# Patient Record
Sex: MALE | Race: OTHER
[De-identification: names, ages, dates, MRNs, and addresses within clinical notes are randomized per-mention and may not be internally consistent; named-entity substitution may affect disease eponyms.]

---

## 2019-12-27 ENCOUNTER — HOSPITAL ENCOUNTER (OUTPATIENT)
Dept: HOSPITAL 84 - D.ER | Age: 34
LOS: 1 days | Discharge: HOME | End: 2019-12-28
Attending: INTERNAL MEDICINE
Payer: MEDICAID

## 2019-12-27 VITALS — DIASTOLIC BLOOD PRESSURE: 69 MMHG | SYSTOLIC BLOOD PRESSURE: 110 MMHG

## 2019-12-27 VITALS
BODY MASS INDEX: 29.41 KG/M2 | WEIGHT: 205.43 LBS | HEIGHT: 70 IN | BODY MASS INDEX: 29.41 KG/M2 | HEIGHT: 70 IN | WEIGHT: 205.43 LBS

## 2019-12-27 VITALS — SYSTOLIC BLOOD PRESSURE: 138 MMHG | DIASTOLIC BLOOD PRESSURE: 87 MMHG

## 2019-12-27 DIAGNOSIS — S61.542A: Primary | ICD-10-CM

## 2019-12-27 DIAGNOSIS — F41.0: ICD-10-CM

## 2019-12-27 DIAGNOSIS — F45.22: ICD-10-CM

## 2019-12-27 DIAGNOSIS — F14.90: ICD-10-CM

## 2019-12-27 DIAGNOSIS — F17.203: ICD-10-CM

## 2019-12-27 DIAGNOSIS — G62.9: ICD-10-CM

## 2019-12-27 DIAGNOSIS — F43.10: ICD-10-CM

## 2019-12-27 DIAGNOSIS — F19.10: ICD-10-CM
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
## 2019-12-27 DIAGNOSIS — M54.9: ICD-10-CM

## 2019-12-27 DIAGNOSIS — L03.114: ICD-10-CM

## 2019-12-27 DIAGNOSIS — T79.A12A: ICD-10-CM

## 2019-12-27 LAB
ALBUMIN SERPL-MCNC: 3.7 G/DL (ref 3.4–5)
ALP SERPL-CCNC: 79 U/L (ref 46–116)
ALT SERPL-CCNC: 19 U/L (ref 10–68)
AMPHETAMINES UR QL SCN: NEGATIVE QUAL
ANION GAP SERPL CALC-SCNC: 19.2 MMOL/L (ref 8–16)
APPEARANCE UR: CLEAR
BARBITURATES UR QL SCN: NEGATIVE QUAL
BASOPHILS NFR BLD AUTO: 0.1 % (ref 0–2)
BENZODIAZ UR QL SCN: POSITIVE QUAL
BILIRUB SERPL-MCNC: 0.77 MG/DL (ref 0.2–1.3)
BILIRUB SERPL-MCNC: NEGATIVE MG/DL
BUN SERPL-MCNC: 23 MG/DL (ref 7–18)
BZE UR QL SCN: NEGATIVE QUAL
CALCIUM SERPL-MCNC: 9.5 MG/DL (ref 8.5–10.1)
CANNABINOIDS UR QL SCN: POSITIVE QUAL
CHLORIDE SERPL-SCNC: 100 MMOL/L (ref 98–107)
CO2 SERPL-SCNC: 20.8 MMOL/L (ref 21–32)
COLOR UR: YELLOW
CREAT SERPL-MCNC: 1.2 MG/DL (ref 0.6–1.3)
CRP SERPL-MCNC: 22 MG/DL (ref 0–0.9)
EOSINOPHIL NFR BLD: 0.2 % (ref 0–7)
ERYTHROCYTE [DISTWIDTH] IN BLOOD BY AUTOMATED COUNT: 13.7 % (ref 11.5–14.5)
GLOBULIN SER-MCNC: 4.3 G/L
GLUCOSE SERPL-MCNC: 95 MG/DL (ref 74–106)
GLUCOSE SERPL-MCNC: NEGATIVE MG/DL
HCT VFR BLD CALC: 40.3 % (ref 42–54)
HGB BLD-MCNC: 13.6 G/DL (ref 13.5–17.5)
IMM GRANULOCYTES NFR BLD: 0.3 % (ref 0–5)
KETONES UR STRIP-MCNC: (no result) MG/DL
LYMPHOCYTES NFR BLD AUTO: 6.8 % (ref 15–50)
MCH RBC QN AUTO: 30 PG (ref 26–34)
MCHC RBC AUTO-ENTMCNC: 33.7 G/DL (ref 31–37)
MCV RBC: 89 FL (ref 80–100)
MONOCYTES NFR BLD: 7.8 % (ref 2–11)
NEUTROPHILS NFR BLD AUTO: 84.8 % (ref 40–80)
NITRITE UR-MCNC: NEGATIVE MG/ML
OPIATES UR QL SCN: NEGATIVE QUAL
OSMOLALITY SERPL CALC.SUM OF ELEC: 273 MOSM/KG (ref 275–300)
PCP UR QL SCN: NEGATIVE QUAL
PH UR STRIP: 5 [PH] (ref 5–6)
PLATELET # BLD: 240 10X3/UL (ref 130–400)
PMV BLD AUTO: 10 FL (ref 7.4–10.4)
POTASSIUM SERPL-SCNC: 5 MMOL/L (ref 3.5–5.1)
PROT SERPL-MCNC: 8 G/DL (ref 6.4–8.2)
PROT UR-MCNC: NEGATIVE MG/DL
RBC # BLD AUTO: 4.53 10X6/UL (ref 4.2–6.1)
SODIUM SERPL-SCNC: 135 MMOL/L (ref 136–145)
SP GR UR STRIP: 1.01 (ref 1–1.02)
UROBILINOGEN UR-MCNC: NORMAL MG/DL
WBC # BLD AUTO: 17.3 10X3/UL (ref 4.8–10.8)

## 2019-12-27 PROCEDURE — 0KND0ZZ RELEASE LEFT HAND MUSCLE, OPEN APPROACH: ICD-10-PCS | Performed by: ORTHOPAEDIC SURGERY

## 2019-12-27 PROCEDURE — 0JCH0ZZ EXTIRPATION OF MATTER FROM LEFT LOWER ARM SUBCUTANEOUS TISSUE AND FASCIA, OPEN APPROACH: ICD-10-PCS | Performed by: ORTHOPAEDIC SURGERY

## 2019-12-27 NOTE — NUR
DR. JARRELL IN ROOM TO ASSESS PATINET LEFT HAND. PATIENT STORY CHANGES SEVERAL
TIMES WHEN ASKING TIMELINE OF WHEN SWELLING BEGAN.  PATIENT STATES HE IS
UNSURE WHEN NEEDLE WAS STUCK IN ARM BUT STATES THAT SWELLING IS FROM NEEDLE.
THEN PATIENT STATES THAT THE SWELLING IS FROM A RECENT FALL. DR. JARRELL
LISTENED TO PATIENT CONCERNS. EXAMINED HAND. DR. JARRELL DISCUSSED TREATMENT
PLAN WITH PATIENT.

## 2019-12-27 NOTE — NUR
SPOKE WITH PATIENT ABOUT STARTING IV ABX NOW THAT LABS WERE OBTAINED. PATIENT
STATES "SO IM GOING TO GET IV MEDS TO CALM ME DOWN?" PROVIDED TEACHING ABOUT
ABX AND TREATMENT AND THAT THERE ARE NO MEDS AT THIS TIME SPECIFICALLY TO CALM
PATIENT. PATIENT IRRITABLE AND SHORT WITH THIS NURSE.

## 2019-12-27 NOTE — NUR
PT STATES HE HAS CHRONIC PAIN AND WASN'T GETTING RELIEF FROM TAKING HIS
OXYCODONE. PT REPORTS SHOOTING UP OXYCODONE BUT DOESN'T REMEMBER NEEDLE
BREAKING INTO ARM.

## 2019-12-27 NOTE — NUR
RECEIVED PATIENT TO FLOOR VIA WHEEL CHAIR. PATIENT ALERT AND ORIENTED.
AMBULATES FROM WHEEL CHAIR TO BED WITH MINIMAL ISSUES. PATIENT LEFT HAND IS
SWOLLEN AND RED. BLANCHABLE AT THIS TIME. PATIENT SHOWS THIS NURSE WHERE
NEEDLE IS IN ARM IN THE RAIDAL AREA. PATIENT STATES THAT HE WAS SHOOTING UP
OXYCODONE BECAUSE HE RECENTLY HAD A CHANGE IN HIS MEDICATION THAT HE STATES
WAS "A HORRIBLE DECISION". HE STATES "I USED TO SHOOT UP YEARS AGO BUT PRAVIN
BEEN DOING GOOD WITH MY PAIN PUMP AND THE IV MORPHINE AND NALOXONE IT GIVES
ME. WHEN I SWITCHED OFF OF THAT IT WENT REALLY BAD. SO I MESSED UP AND WAS
SHOOTING UP MY PILLS AND NOW THIS HAPPENED." PATIENT CAN NOT RECALL WHEN
NEEDLE BROKE OFF IN ARM. HE DOES MENTION A FALL THAT HAPPENED 3 DAYS AGO THAT
HE BELIEVES IS WHAT CAUSED THE HAND TO SWELL. PATIENT GROWS VERY TEARFUL AND
UPSET DURING ASSESSMENT STATING THAT HE HAS NO RELIEF FROM HIS PAIN AND HE
SUFFERS FROM PTSD. HE STATES THAT HE HAS BEEN HERE FOR HOURS AND NO ONE WILL
GIVE HIM THE MEDICINES HE NEEDS. PROVIDED SUPPORT TO PATIENT. SPOKE WITH
PATIENT THAT THE DISCIPLINARY TEAM IS HERE TO PROVIDE HELP AND TOLERABLE PAIN
RELIEF. PATIENT STATES SEVERAL TIMES THAT HE WISHES TO BE "SEDATED"
"EUTHANIZED" OR "TRANQUILIZED" PATIENT STATES "I JUST WANT TO GO TO SLEEP AND
NOT HAVE TO DEAL WITH THIS! YOU NEED TO GIVE ME SOMETHING FOR MY ANXIETY!"
"TOLERABLE PAIN ISN'T EVEN A GOAL AT THIS POINT!"

## 2019-12-27 NOTE — NUR
CALMED PATIENT DOWN SEVERAL TIMES. PATIENT AT ONE TIME WAS YELLING LOUDLY AND
SOBBING STATING "I FEEL PERSONALLY VICTIMIZED THAT YOU AREN'T GIVING ME PAIN
MEDICINE!" PATIENT ASKED SEVERAL TIMES "AM I NOT WORTHY ENOUGH FOR PAIN
MEDICINE????" REDIRECTED PATIENT AND TOLD HIM THAT THIS NURSE HAD STRICT
ORDERS FOR PO TORADOL, ATARAX, AND TYLENOL FOR PAIN RELIEF MEASURES. PATIENT
STATES "THAT'S NOT EVEN NARCOTIC!!! AT LEAST INJECT THE TORADOL IN MY ASS!"
TAKES SEVERAL MINUTES TO CALM PATIENT DOWN.

## 2019-12-27 NOTE — NUR
IV ATTEMPTED 2X BY MYNOR HWANG, 2X BY KASI STILES RN, AND BY MYNOR WHITE,
VASCULAR ACCESS SPECIALIST BY ULTRASOUND. ALL ATTEMPTS FAILED. TALKED WITH DR HERNANDEZ AND HE SAID HE WOULD GET ACCESS BEFORE SURGERY TOMORROW.
 
PO ABX BEING ORDERED FOR NOW.

## 2019-12-28 VITALS — SYSTOLIC BLOOD PRESSURE: 113 MMHG | DIASTOLIC BLOOD PRESSURE: 78 MMHG

## 2019-12-28 VITALS — DIASTOLIC BLOOD PRESSURE: 74 MMHG | SYSTOLIC BLOOD PRESSURE: 131 MMHG

## 2019-12-28 VITALS — SYSTOLIC BLOOD PRESSURE: 124 MMHG | DIASTOLIC BLOOD PRESSURE: 71 MMHG

## 2019-12-28 VITALS — SYSTOLIC BLOOD PRESSURE: 143 MMHG | DIASTOLIC BLOOD PRESSURE: 61 MMHG

## 2019-12-28 VITALS — SYSTOLIC BLOOD PRESSURE: 122 MMHG | DIASTOLIC BLOOD PRESSURE: 83 MMHG

## 2019-12-28 VITALS — DIASTOLIC BLOOD PRESSURE: 86 MMHG | SYSTOLIC BLOOD PRESSURE: 134 MMHG

## 2019-12-28 LAB — ERYTHROCYTE [SEDIMENTATION RATE] IN BLOOD: 78 MM/HR (ref 0–15)

## 2019-12-28 NOTE — NUR
PATIENT UPSET BECAUSE DR. FRANK DC'D IV PAIN MEDS. STATED HE WAS CALM AND
FINE THEN BUT NOW HE IS GOING TO START HAVING ANXIETY AND HURTING. EXPLAINED
TO PATIENT HE STILL HAS PAIN MEDS HE CAN HAVE AND OFFERED OX IR AS ORDERED.
PATIENT STILL AGRUING AND UPSET. NOW STARTED SAYING HIS IV IS MESSING UP AGAIN
AND THAT HE WANTS IT CHANGED. DR. FRANK INTO SEE PATIENT EXPLAINED NO IV
PAIN MEDS OR NARCOTICS, AND THAT HIS IV IS FINE TO LEAVE IT ALONE. PATIENT
ARGUMENTATIVE AND UPSET. IV INTACT. STILL. CALL LIGHT WITHIN REACH.

## 2019-12-28 NOTE — NUR
PATIENT CONTINUALLY STATING "THEY ARE GIVING ME CARE! THEY WROTE 'JUAN PATHAK'
ON MY ARM! THEY LEFT THE NEEDLE IN THERE! THEY DID NOT DO WHAT THEY WERE
SUPPOSED TO DO! THEY ARE MAKING THE [facility] PAY FOR A BOTCHED
SURGERY." PATIENT REFUSES TO ALLOW NURSING STAFF TO RE WRAP HAND WITH GAUZE
AND ACE WRAP. PATIENT REMOVED EARLIER AGAINST MEDICAL ADVICE. [employer]
POLICE DEPARTMENT DEPUTIES REMAIN IN ROOM.

## 2019-12-28 NOTE — NUR
PATIENT RECIEVED SCHEDULED MEDS AND DILAUDID IVP SLOWLY OVER 1 MIN. IV INTACT.
DRESSING TO HAND CDI. CALL LIGHT WITHIN REACH.

## 2019-12-28 NOTE — NUR
PATIENT RECIEVED OXY. STATED IT WASNT GOING TO HELP HIM BECAUSE HE TOOK 15 MG
AT HOME. EXPLAINED TO PATIENT I WOULD CALL DR. FRANK TO SEE IF I COULD GET
ANY MORE PAIN MEDS OR ANXIETY MED. JAVY HOUSESUPERVISOR AT SIDE.

## 2019-12-28 NOTE — NUR
AT 0900 THIS AM, PT WAS VERY BELLIGERENT AND CUSSING NURSING STAFF.  I WAS
CALLED TO SPEAK TO PT. WHEN TALKING WITH PT, HE BECAME VERY ANXIOUS AND
INSISTED STAFF WAS LYING ABOUT IV INFILTRATION. EXPLAINED TO PT ABOUT IV BEING
IN AND MAY NOT PULL BACK BLOOD. I CHECKED IV NO BLODD BACK BUT WHEN I TRIED TO
FLUSH THE PIV, PT SCREAMED AND YELLED THAT IT WAS HURTING. PT ALSO INSISTED
PAIN MEDS WERE Q2 AND HE WAS NOT RECIEVING IV ANTIBIOTICS AS HE SHOULD. MAR
WAS REVIEWED AND EXPLAINED TO PT HIS PAIN MEDS WERE Q3 AND ANTIBIOTICS WERE
GIVEN AS ORDERED. PT INSISITED PIV TO BE REMOVED AND WANTS TO SEE HIS DOCTOR.
DR. JARRELL MAKING ROUNDS ON UNIT AND WENT INTO PTS ROOM. DR. JARRELL TRYING
TO EXPLAIN TO PT POC BUT PT KEPT INTERRUPTING. DR JARRELL INFORMED PT TO LET
HIM EXPLAIN POC. FINALLY PT LISTENED. IV REMOVED AND ANOTHER STARTED AFTER 4
TRYS BY DR. JARRELL.  OR CONSENTS SIGNED AND THEN ATIVAN 0.5 MG GIVEN IV. PT
THEN WENT TO CT SCAN.

## 2019-12-28 NOTE — NUR
ATTEMPTING TO ADMINISTER MEDICATIONS TO PATIENT. PATIENT BEING NON COMPLIANT
AT THIS TIME AND CONTINUALLY WANTS TO ARGUE. PATIENT STATES HE IS RECORDING
EVERYTHING AT THIS TIME. PATIENT REFUSES TO LISTEN TO THIS NURSE WHEN TRYING
TO PROVIDE INSTRUCTION ABOUT MEDICATIONS.

## 2019-12-28 NOTE — NUR
ADAPTIC COVERED WITH KERLEX WRAP APPLIED TO LEFT ARM AND HAND IN PRESENCE OF
POLICE OFFICERS, BY ELISE MEMBRENO RN.

## 2019-12-28 NOTE — NUR
PROVIDING DISCHARGE INSTRUCTIONS WITH PATIENT. REVIEIWING MEDICATIONS THAT
WERE ORDERED FOR PATIENT. JESS EVANS RN, IN ROOM AS WELL AS [facility]
[employer]ICE DEPARTMENT. WENT INTO DEPTH AND DETAIL ABOUT IMPORTANCE OF CONTINUING
ANTIBIOTICS AND THAT [facility]
[address] DUE TO 24 HOUR AVAILABLITY. PATIENT STATES UNDERSTANDING AND
REVIEWS MEDICATIONS AND THEN STATES "SO IM NOT GETTING ANY PAIN MEDICINE!? I
AM IN 10/10 PAIN! YOU DON'T CARE ABOUT MY PAIN?" INSTRUCTED PATIENT THAT HE IS
NOT BEEN PRESCRIBED ANY PAIN MEDICINE BY THE DOCTORS AT THIS HOSPITAL BUT THE
HOME MEDICATIONS LISTED IN THE DISCHARGE PACKET ARE OKAY TO BE CONTINUED.
PATIENT ATTEMPTED TO GET ANGRY AND YELL AT NURSING STAFF BUT [facility]
 INSTRUCTED PATIENT TO BE QUIET AND NOT ARGUE.
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
## 2019-12-28 NOTE — NUR
RECEIVING BEDSIDE REPORT AND PATIENT OUT IN HALLWAY BEING BELLIGERENT. STATES
HE IS NOT RECEIVING MEDICAL CARE. PATIENT HAS NO IV ACCESS BECAUSE HE HAS
REMOVED THE PRIOR IV THAT WAS PLACED BY DR. JARRELL EARLIER. ATTEMPED TO CALM
PATIENT SEVERAL TIMES TO NO AVAIL. INSTRUCTED PATIENT THAT MEDICATIONS ARE
SCHEDULED AND HE WILL RECEIVED THEM AT [phone]. PATIENT REFUSES TO CALM DOWN
AND LISTEN TO INSTRUCTION. SECURITY ON FLOOR DUE TO PATIENT REFUSING TO CALM
DOWN AND SCREAMING, CURSING, AND SHOWING OUT IN HALLWAY.

## 2019-12-28 NOTE — NUR
STOOD WITH MEDICATIONS IN HAND FOR SEVERAL MINUTES ATTEMPTING TO PROVIDE
MEDICAL EDUCATION ABOUT MEDICATIONS. PATIENT NON RECEPTIVE. PATIENT FINALLY
AGREED TO TAKE ANTIBIOTICS AS ORDERED. PATIENT HAS BEEN ASKING FOR
ANTIBIOTICS, WHEN ADMINISTERING PATIENT STATES "I AM NOT GETTING WHAT I NEED!"
INSTRUCTED PATIENT THAT HE IS RECEIVING ANTIBIOTICS AND HE JUST TOOK THEM BUT
PATIENT UNABLE TO UNDERSTAND THAT HE IS IN FACT RECEIVING ANTIBIOTICS AS
PRESCRIBED BY THE DOCTOR.

## 2019-12-28 NOTE — NUR
PATIENT ON PHONE AT THIS TIME COMPLAINING AND SPEAKING LOUDLY ABOUT HIS CARE
AT THE HOSPITAL AND HOW HORRIBLE IT IS. NO IV BECAUSE HE REMOVED IT BECAUSE HE
SAID IT WASNT WORKING AFTER BEING CHECKED BY HOUSESUPERVISOR AND MYSELF. NEW
ORDERS FOR PO ANTIBIOTCIS. CALL LIGHT WITHIN REACH.

## 2019-12-28 NOTE — NUR
ENTERED PT ROOM TO TRY TO REASON WITH PT. FOUND THAT PT HAS PULLED OUT HIS IV
AND IS BLEEDING. DR JARRELL NOTIFIED AGAIN AND PO ANTIBIOTIC ORDERS RECEIVED.
PT ROUNDER HAS ARRIVED AND WILL SPEAK WITH PT. ALSO SECURITY PRESENT.
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
## 2019-12-28 NOTE — NUR
PATIENT IS AGAIN SCREAMING IN THE HALLWAY AT THE STAFF THAT HE HAS HAD NO
ANTIBIOTICS, HAS NOT SEEN A DOCTOR, AND THAT HIS HAND IS "GOING TO FALL OFF".
CORINNA RICHTER AND I REDIRECTED THE PATIENT TO HIS ROOM AT THIS TIME. PATIENT
CONTINUES TO BECOME MORE VERBALLY AGGRESSIVE AS ROBER AND I ATTEMPT TO
EXPLAIN TO HIM THAT HIS MEDICATIONS ARE DUE AT 9:00 PM, HIS HAND IS NOT
FALLING OFF, AND HIS DOCTOR WILL BE BACK TO SEE HIM IN THE MORNING. PATIENT
WAS ASKED MULTIPLE TIMES TO LOWER HIS VOICE.
ROBER AND FELA ATTEMPTED TO LEAVE THE ROOM, PATIENT CAME INTO THE HALLWAY AND
CONTINUED SCREAMING AT US. SECURITY CALLED AT THIS TIME. PATIENT BACK IN ROOM.
ROBER AND FELA CONTINUE TO ATTEMPT TO EXPLAIN TO THE PATIENT.

## 2019-12-28 NOTE — NUR
CALL TO DR. JARRELL TO INFORM HIM THAT PATIENT STATES "MY HAND IS ROTTING OFF!
IT IS DYING! EVERYONE IS REFUSING TO GIVE ME CARE!" THIS NURSE HAS ATTEMPTED
TO GIVE PATIENT CARE BUT PATIENT REFUSES TO CONSENT TO CARE WITH ARGUING.
PATIENT VITAL SIGNS OBTAINED BY ISAAC YODER. VITAL SIGNS ARE STABLE AT THIS
TIME. REPORTED VITAL SIGNS TO DR. JARRELL. PATIENT CONTINUES TO FLAIL INFECTED
HAND AND MOVE FINGERS THOUGH HE STATES IT IS DYING. FINGERS REMAIN FLESH
COLORED, DO NOT APPEAR TO HAVE LOST ANY BLOOD FLOW. REPORTED TO DR. JARRELL
THAT SURGICAL SITE DOES NOT APPEAR TO HAVE BECOME MORE INFECTED AND THAT
PATIENT WILL BE RECEIVING SCHEDULED DOSES OF ANTIBIOTICS AS ORDERED. NO NEW
ORDERS GIVEN AT THIS TIME. CPOC.

## 2019-12-28 NOTE — NUR
PATIENT LEFT UNTI VIA WHEELCHAIR WITH TWO [place] [employer] DEPARTMENT
DEPUTIES AND RN. ALL BELONGINGS IN HAND. DISCHARGE PAPERS IN HAND.
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
## 2019-12-28 NOTE — NUR
PATIENT BACK FROM SURGERY. IV INTACT. VS STABLE. NO PAIN MEDS IN RR. IV DIL
GIVEN WITH SCHEDULED MEDS. PATIENT SITTING UP IN BED. GOING TO EAT LUNCH. NO
COMPLAINTS. VS STABLE. CALL LIGHT WITHIN REACH.

## 2019-12-28 NOTE — NUR
CALLED TO PTS ROOM BY PT. WALKING DOWN HALLWAY, HEARD PT YELLING AND SCREAMING
THAT IV IS NOT WORKING AND HAND IS RE- INFECTED. WALKED INTO PTS ROOM. TRIED
TO EXPLAIN TO PT THAT IV IS WORKING AND HE IS RECEIVING ANTIBIOTICS. PT SAYING
HIS HAND HAS BECOME REINFECTED. TRIED TO EXPLAIN TO PT THAT HE IS RECEIVING
TREATMENT. PT STILL AGRUING WITH MYSELF AND NURSE. PT INSISTED ON US CALLING
DR. JARRELL. DR. JARRELL NOTIFIED AND SITUATION EXPLAINED TO HIM. DR. JARRELL
SAID TO EXPLAIN TO PT THAT SURGERY WAS SUCCESSFUL IN CLEANING OUT INFECTION.
WHEN WALKING BACK INTO ROOM, SAW PT UNPLUGGING AND TURNING IV OFF. TRYING TO
EXPLAIN TO PT ABOUT DR. JARRELL EXPLANATION. PT IS ARGUMENTATIVE AND WILL NOT
LISTEN.

## 2019-12-28 NOTE — NUR
SPOKE WITH DR. SAAVEDRA. NEW ORDERS PUT IN COMPUTER. TRIED TO EXPLAIN TO
PATIENT WHAT WAS SAID AND DONE. PATIENT SCREAMING AND YELLING AT JAVY AT THIS
TIME. CALL LIGHT WITHIN REACH.

## 2019-12-28 NOTE — NUR
PATIENT YELLING AND SCREAMING IN ROOM AND DOWN THE HALLWAY. WENT INTO PATIENTS
ROOM TO FIND OUT WHAT HAPPENED. PATIENT STARTED YELLING AT ME AND TELLING ME
HE HAS NOT HAD ADEQUATE CARE SINCE 2 AM AND THAT HIS HAND IS SWELLING MORE AND
HE IS HURTING AND WANTS MORE PAIN MEDS AND ANTIBIOTIC BECAUSE HE HASNT HAD
ANY. TRIED TO GO OVER MEDS WITH PATIENT. EXPLAINED WHAT TIMES HE HAD RECIEVED
MEDS AND WHEN HE CAN HAVE MORE. PATIENT DEMANDING TO GET ANTIBIOTIC NOW AND
YELLING AND CUSSING AT ME. TRIED TO ASK PATIENT NOT TO YELL AND EXPLAINED TO
HIM THAT HE CAN ONLY HAVE ANTIBIOTICS WHEN THEY ARE SCHEDULED BECAUSE IF HE
RECIEVED TO MUCH OF THEM IT COULD CAUSE PROBLEMS WITH OTHER THINGS IN HIS
BODY. PATIENT NOT SATISFIED, AND STILL YELLING AND CUSSING. CALLED SECURITY TO
ROOM.

## 2019-12-28 NOTE — NUR
PATIENT SCREAMS IN HALLWAY "CALL THE FUCKING DOCTOR! CALL THE FUCKING DOCTOR!
I WANT TO SEE A MOTHER FUCKING DOCTOR!" ASKED PATIENT TO CALM DOWN AND TO NOT
YELL AT THIS NURSE OR OTHER STAFF. ATTEMPTED TO PROVIDE REASSURANCE TO PATIENT
ABOUT HAND AND THAT UPON ASSESSMENT IT DOES NOT APPEAR TO BE WORSENING.
SPOKE WITH PATIENT ABOUT PROGRESS AND THAT ANTIBIOTICS ARE TREATING INFECTION
AND THAT SURGICAL WOUNDS HEAL WITH TIME AS ALL DO. PATIENT NOT RECEPTIVE AT
ALL. WHEN ATTEMPTING TO TALK TO PATIENT, PATIENT REFUSES TO LISTEN TO ANY SORT
OF INSTRUCTION OR EDUCATION. PATIENT REFUSES FOR VITALS TO BE ASSESSED AGAIN.
SECURITY REMAINS ON FLOOR TO MONITOR PATIENT.

## 2019-12-28 NOTE — NUR
PATIENT WALKED INTO ROOM 2223. I EXPLAINED TO THE PATIENT THAT HE CAN'T GO
INTO ROOMS OTHER THAN HIS OWN. PATIENT STATED THAT HE WANTED TO MAKE A PHONE
CALL ON THE PHONE IN THAT ROOM AND THE ROOM WAS EMPTY. I EXPLAINED TO THE
PATIENT THAT HE NEEDED TO MAKE HIS PHONE CALL IN HIS OWN ROOM AND THAT HE
CANNOT GO INTO CLEAN ROOMS TO MAKE HIS PHONE CALL. PATIENT VERBALIZED
UNDERSTANDING AND THEN PROCEEDED TO TELL ME THAT HE WANTED TO SEE A DOCTOR
BECAUSE HIS HAND WAS GOING TO "FALL OFF BECAUSE OF SEPSIS". THE PATIENT
CONTINUED TO SCREAM IN THE HALLWAY STATING THAT HE HAS HAD NO CARE FROM ANY OF
THE MEDICAL STAFF AND THAT HE HAS NOT SEEN A DOCTOR. I EXPLAINED TO THE
PATIENT THAT HE HAD ALREADY BEEN INFORMED THAT HIS MEDICATIONS ARE DUE AT
9:00PM AND THAT THE DOCTOR HAD ALREADY SEEN HIM TODAY AND WOULD BE BACK IN THE
MORNING SEVERAL TIMES.

## 2019-12-28 NOTE — NUR
PATIENT AGREES TO TAKE MEDICATIONS AT THIS TIME. PATIENT REFUSES FOR VITAL
SIGNS TO BE OBTAINED THOUGH THIS NURSE STATES IT IS IMPORTANT TO ASSESS VITALS
REGULARLY. PATIENT BELLIGERENT. CONTINUALLY STATES "I AM NOT RECEIVING MEDICAL
CARE THAT I NEED!"

## 2019-12-28 NOTE — NUR
SW WAS DOING ROUNDS FOR ADMINISTRATION. SW WAS IN ROOM FOR AN HOUR AND A HALF
TO TRY TO DE ESCALATE THE SITUATION.  PT WAS LABILE STATING HE IS NOT GETTING
THE CARE HE NEEDS AND DEMANDED TO SEE THE MD IN PERSON RIGHT NOW. PT STARTED
SAYING THEY STOLE THE PHONE  FROM HIS ROOM WHEN STAFF WAS ASKED THEY
SAID THEY DON'T KNOW ABOUT HIS . HE THEN CAME OUT OF THE ROOM AND
STATED IT WASN'T HIS  IT WAS THE STAFF'S . SW FOUND ANOTHER
 FOR PT TO BORROW. HE REPORTED THINGS ABOUT INFECTION AND HIS HAND
GOING TO HAVE TO BE AMPUTATED DUE TO POOR CARE. HE CONTINUOUSLY YELLED AT
STAFF AND SEEMED PARANOID ABOUT STAFF BEING AGAINST HIM. SW TRIED TO EXPLAIN
TO HIM THE STAFF WERE ON THE SAME PAGE AND WANTED HIS HAND TO GET BETTER AND
THE GOAL FOR TREATMENT WAS TO HAVE HIM DISCHARGE WITH A HEALTHY HAND. PT ASKED
FOR PAPER AND A PEN AND SW DELIVERED IT TO HIS ROOM. SW THEN TRIED TO EXPLAIN
THE MD THAT DID THE SURGERY WOULD FOLLOW UP TOMORROW MORNING AND PT STARTED
YELLING AT  STATING SHE NEEDED TO GET ON THE PHONE AND CALL THE MD NOW. JESSI
STATED BOTH MDS WERE CALLED AND REPORT WAS GIVEN. PT STATED THAT WAS NOT GOOD
ENOUGH. HE STATED HE WOULD CALL 911. HE ENDED UP YELLING MORE AT . SW
EXPLAINED HE IS BEING CARED FOR AND IT WAS REPORTED TO THE MD. PT WAS NOT
WILLING TO LISTEN AND GOT IN THE HALLWAY SCREAMING AT STAFF. SECURITY WAS
CALLED.

## 2019-12-28 NOTE — NUR
PATIENT IN BED WITH EYES CLOSED RESTING QUIETLY. IV INTACT. SATS 92 % ON RA.
PLACED NEW PROBE ON PATIENTS TOE AND SATS STAYING AT 92% ON RA WHILE SLEEPING.
SATS UP % ON RA WHILE AWAKE. MOM AND DAD AT BEDSIDE. CALL LIGHT WITHIN
REACH.
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
## 2019-12-28 NOTE — NUR
PATIENT RECIEVED SCHEDULED PO MEDS WITH SECURITY IN THE ROOM WITH ME. PATIENT
MAD AND STATED THAT I SHOULDNT HAVE CALLED SECURITY BECAUSE HE WOULDNT HURT ME
BECAUSE HE IS DISABLED. TRY TO EXPLAIN THAT I BROUGHT HIM BECAUSE I DIDNT WANT
TO BE IN A ROOM ALONE WHILE HE IS CUSSING AND SCREAMING AT ME. PATIENT SAID I
SHOULD JUST DO MY JOB INSTEAD OF WASTING MY TIME. EXPLAINED TO PATIENT TO
PLEASE TAKE HIS MEDS. PATIENT STATED HE DIDNT WANT TO JUST TAKE ONE NORCO 10
BECAUSE IT DOESNT HELP HIS PAIN. EXPLAINED TO PATIENT THAT WAS ALL I COULD
GIVE HIM AT THIS TIME. PATIENT YELLING AT ME BECAUSE I WONT GIVE HIM HIS IV
ANTIBIOTIC RIGHT NOW. EXPLAINED HE CAN NOT TAKE MEDS UNTIL IT IS DUE. NOT DUE
TILL 1130. PATIENT STATED THAT HIS HAND IS WORSE AND HE NEEDS HIS MED NOW.
CALLED JAVY AT THIS TIME. JAVY TO FLOOR TO SPEAK WITH PATIENT.
 
 
TRIED AGAIN TO EXPLAIN MED IS NOT DUE. PATIENT MAD AND WANTS HIS IV OUT.
STATING IT IS WORKING. EXPLAINED IV IS WNL. FLUSHED AND PULLED BACK BLOOD.
PATIENT STATED HE KNOWS ITS NOT WORKING AND WANTS ME TO DO IT ONE MORE TIME
BEFORE HE RECIEVES THE ONE ONUR DOSE OF ATIVAN THE  STATED HE COULD HAVE.
FLUSHED AND PULLED BACK BLOOD AGAIN. PATIENT STATED I LIED THAT THERE IS NO
BLOOD IN THE LINE THAT THE IV IS BAD. EXPLAINED TO PATIENT I WOULD NOTIFY THE
HOUSE SUPERVISOR AND HAVE HER COME TAKE A LOOK AT IT BECAUSE I THINK HIS IV IS
GOOD AND THAT I KNOW HE IS A HARD STICK SO I DIDNT WANT TO TAKE IT OUT AT THIS
TIME.

## 2019-12-28 NOTE — NUR
AS PER DR DEL VALLE, RADIOLOGIST, NOT SAFE TO DO MRI LT HAND UNTIL AFTER NEEDLE
IN LT WRIST IS REMOVED.
 
INFORMED DR JARRELL.

## 2019-12-28 NOTE — NUR
HOUSE SUPERVISOR ON FLOOR. PATIENT CONTINUING TO SCREAM IN HALLWAY. CONTINUING
TO SCREAM AT STAFF AND STATING "MY  IS COMING. I AM GOING TO GET ALL
OF YOU MOTHER FUCKERS. YOU WILL ALL BE FUCKING SUBPONEAED! FUCK ALL OF YOU!"
PATIENT BECOMING INCREASINGLY IRRITABLE, SHOUTING, CUSSING, AND THREATENING
STAFF. HOUSE SUPERVISOR INSTRUCTED THIS NURSE TO CALL 911. CALLED 911. PATIENT
STATES HE NEEDS HIS MEDICAL RECORDS AND HE IS LEAVING. INFORMED PATIENT THAT
HE WILL HAVE TO OBTAIN MEDICAL RECORDS MONDAY MORNING FROM MEDICAL RECORDS
DEPARTMENT. PATIENT CONTINUALLY SCREAMS "I AM GOING TO CARYN ALL OF YOU MOTHER
FUCKERS!" "I AM RECORDING ALL OF THIS" INFORMED PATIENT THAT NO CONSENT HAS
BEEN GIVEN FOR RECORDING, PATIENT STATES "I DON'T CARE!!!" PATIENT REFUSES TO
CALM DOWN. PATIENT REFUSES TO STOP SCREAMING. PATIENT REFUSES TO STOP WALKING
IN HALLWAY AND TO OTHER PATIENTS ROOMS. OTHER PATIENTS ARE HITTING CALL LIGHT
STATING THEY ARE IN FEAR DUE TO DISRUPTION.

## 2019-12-28 NOTE — NUR
NOTIFIED MANOJ [place] SUPERVISOR, THAT THE PATIENT IS BEING DISCHARGED PER
DR. FRANK'S ORDERS

## 2019-12-29 NOTE — OP
PATIENT NAME:  AD BERMUDEZ                        MEDICAL RECORD: [id number]
:89                                             LOCATION:[id number]
                                                         ADMISSION DATE:19
SURGEON:  MATT JARRELL DO         
 
 
DATE OF OPERATION:  2019
 
PROCEDURE PERFORMED:  Left hand incision and debridement and left wrist foreign
body removal.
 
PREOPERATIVE DIAGNOSES:  Left hand compartment syndrome and cellulitis on the
dorsal surface and foreign body in the left wrist.
 
POSTOPERATIVE DIAGNOSES:  Left hand compartment syndrome and cellulitis on the
dorsal surface and foreign body in the left wrist.
 
INDICATIONS:  Mr. Bermudez is a 34-year-old male who presented to the ER yesterday
with left hand swelling and pain.  He says it has been going about 2 days.  He
did not know how long the foreign body.  X-rays were taken, which showed what
appeared to be a needle foreign body in his left wrist on the radial side.  He
does not know when it got there, but he knows there is a 30-gauge needle.  His
left hand was extremely swollen and erythematous and he had pain with passive
stretch of his fingers.  I told him this is likely just cellulitis and that IV
antibiotics may do the trick.  They finally got an IV started on last night, he
did get some IV antibiotics started on vancomycin and Zosyn.  I came in this
morning, it was more red and swollen, and informed them we get a CT or an MRI
originally to see if there was any abscess to go after.  The radiologist did not
want to do a CT or an MRI due to the fact he had a metallic foreign body in the
wrist, was afraid that it would pull it out.  We got a CT and no abscess was
seen, cellulitis only, but due to the fact that he was so swollen and we going
in to get the foreign body and I told him I will do a dorsal hand incision to
relieve the pressure from the cellulitis and the compartment type syndrome that
was looming.  The patient was informed of the other risks including continued
infection, bleeding, damage to nerves and vessels, need for further surgery,
long-term antibiotics and he signed the consent.
 
SURGEON:  Matt Jarrell DO
 
DESCRIPTION OF PROCEDURE:  The patient was taken to the operative suite, laid in
supine position, given general anesthetic.  LMA was placed.  He was given Zosyn
preoperatively.  The left upper extremity was then prepped and draped in sterile
fashion.  Tourniquet was on.  The arm was held up above his body for a minute
and the tourniquet was inflated to 250 mmHg, it was up for 15 minutes.  We then
located the metallic foreign body on x-ray, AP and lateral.  A small incision
was made over it and it was encountered in soft tissue, it was then removed. 
The hand was then addressed and 3 dorsal incisions were made, one in the
webspace and then one between the second and third metacarpals and then one
between the fourth and fifth, then yellow colored fluid gushed out.  When we did
this, cultures were then taken.  These were then irrigated out and any fluid was
expressed out of the site, it was yellow in nature, but no adan pus was
encountered.  The hemostats were taken and taken down to the dorsal compartments
of the hand and ensuring that they are decompressed as well as the webspace of
the thumb.  These sites were then irrigated and then closed with 4-0 nylon with
a simple fashion, loosely closed so that it could drain and then the incision
where the foreign body was taken out was ran in a running stitch with a 4-0
nylon.  It was then dressed with Adaptic, 4 x 4s, Webril and an Ace wrap.  He
was awakened and taken to recovery in stable condition.
 
 
 
OPERATIVE REPORT                               [id number]    AD BERMUDEZ      
 
 
 
BLOOD LOSS:  Minimal.
 
COMPLICATIONS:  None.
 
I was assisted by Herve El, certified surgical first assist.  He assisted
with closing.
 
TRANSINT:[id number] Voice Confirmation ID: [id number] DOCUMENT ID: [id number]
                                           
                                           MATT JARRELL DO         
 
 
 
Electronically Signed by MATT YE on 19 at 0732
 
 
 
 
 
 
 
 
 
 
 
 
 
 
 
 
 
 
 
 
 
 
 
 
 
 
 
 
 
 
 
 
CC:                                                             [id number]
DICTATION DATE: 19 1200     :     19 1429      DIS IN  
                                                                      19
[facility]                                          
[address] [place] [address]